# Patient Record
Sex: FEMALE | Race: WHITE | NOT HISPANIC OR LATINO
[De-identification: names, ages, dates, MRNs, and addresses within clinical notes are randomized per-mention and may not be internally consistent; named-entity substitution may affect disease eponyms.]

---

## 2024-10-18 ENCOUNTER — DASHBOARD ENCOUNTERS (OUTPATIENT)
Age: 19
End: 2024-10-18

## 2024-10-18 ENCOUNTER — OFFICE VISIT (OUTPATIENT)
Dept: URBAN - METROPOLITAN AREA CLINIC 9 | Facility: CLINIC | Age: 19
End: 2024-10-18
Payer: COMMERCIAL

## 2024-10-18 VITALS
BODY MASS INDEX: 22.4 KG/M2 | SYSTOLIC BLOOD PRESSURE: 112 MMHG | WEIGHT: 131.2 LBS | HEIGHT: 64 IN | DIASTOLIC BLOOD PRESSURE: 67 MMHG

## 2024-10-18 DIAGNOSIS — E73.9 LACTOSE INTOLERANCE: ICD-10-CM

## 2024-10-18 DIAGNOSIS — R19.4 CHANGE IN BOWEL HABITS: ICD-10-CM

## 2024-10-18 DIAGNOSIS — R10.30 LOWER ABDOMINAL PAIN: ICD-10-CM

## 2024-10-18 PROCEDURE — 99204 OFFICE O/P NEW MOD 45 MIN: CPT | Performed by: STUDENT IN AN ORGANIZED HEALTH CARE EDUCATION/TRAINING PROGRAM

## 2024-10-18 NOTE — HPI-TODAY'S VISIT:
EGD:   Colonoscopy:   Imaging/Studies/Procedures: 2020- celiac test?-- normal as per patient. 10/9/23- CBC, CMP (AST 31), UA, TSH, normal.  Pelvic U/S 10/9/23- normal. CT A/P 10/9/23- normal.   - -   PMH:    Family Hx: Grandmother- crohns  GI Hx: ER 10/9/23- abd pain. 10/18/24- The patient presented with a history of severe, localized abdominal pain, which led her to the ER in October 2023. She initially thought the pain was related to her appendix due to its sharp nature and specific location. However, the ER ruled out any major red flags and discharged her with an unspecified treatment. Since then, the patient has not experienced the same level of pain but has had other occurrences of abdominal discomfort. The patient has a severe lactose intolerance and has noticed that certain foods, even those not containing dairy, can trigger symptoms such as severe stomach pain, the need to rush to the bathroom, and vomiting. She has been trying to track her food intake and its correlation with her symptoms but hasn't been able to identify a specific trigger. The patient also reported frequent bloating, nausea, and changes in bowel habits, including instances of liquid stool. She has not noticed any blood or mucus in her stool. The patient has been tested for celiac disease in the past (around 2022), but the results were negative. She was not on a strict gluten-free diet at the time of the test. The patient has not had any previous imaging or testing for conditions such as Hashimoto's or Thyroiditis. She has not been tested for H. pylori, a bacteria that can cause changes in bowel habits and other gastrointestinal symptoms. The patient has not had any joint pains or skin changes. On examination, the patient reported tenderness in the suprapubic area but no pain on inspiration.

## 2024-10-19 PROBLEM — 782415009: Status: ACTIVE | Noted: 2024-10-19

## 2024-10-24 LAB — TSH: 1.25
